# Patient Record
Sex: FEMALE | Race: BLACK OR AFRICAN AMERICAN | NOT HISPANIC OR LATINO | URBAN - METROPOLITAN AREA
[De-identification: names, ages, dates, MRNs, and addresses within clinical notes are randomized per-mention and may not be internally consistent; named-entity substitution may affect disease eponyms.]

---

## 2020-01-07 ENCOUNTER — OFFICE (OUTPATIENT)
Dept: URBAN - METROPOLITAN AREA CLINIC 19 | Facility: CLINIC | Age: 45
End: 2020-01-07
Payer: COMMERCIAL

## 2020-01-07 VITALS
SYSTOLIC BLOOD PRESSURE: 120 MMHG | HEART RATE: 94 BPM | HEIGHT: 68 IN | DIASTOLIC BLOOD PRESSURE: 73 MMHG | WEIGHT: 139 LBS

## 2020-01-07 DIAGNOSIS — K92.1 MELENA: ICD-10-CM

## 2020-01-07 DIAGNOSIS — R19.5 OTHER FECAL ABNORMALITIES: ICD-10-CM

## 2020-01-07 DIAGNOSIS — R19.4 CHANGE IN BOWEL HABIT: ICD-10-CM

## 2020-01-07 PROCEDURE — 99203 OFFICE O/P NEW LOW 30 MIN: CPT | Performed by: INTERNAL MEDICINE

## 2020-01-07 RX ORDER — SODIUM PICOSULFATE, MAGNESIUM OXIDE, AND ANHYDROUS CITRIC ACID 10; 3.5; 12 MG/160ML; G/160ML; G/160ML
LIQUID ORAL
Qty: 1 | Refills: 0 | Status: ACTIVE
Start: 2020-01-07

## 2020-01-07 NOTE — SERVICEHPINOTES
44-year-old  female reports that she noticed a change in her bowel habits in October 2019. Previously she would have a formed bowel movement every other day.  Since October 2019 she started experiencing loose stools with bright red blood mixed with the stool.  Also reports cramping discomfort in the abdomen.  Reports that stool studies were negative at primary care provider's office.  I currently do not have access to these results.  Reports that she was empirically treated with ciprofloxacin.  Continues to report that bowel movements are back and forth.  She has cut down on intake of sodas from 3 to 1/day.  Does not drink carbonated water.  Reports undergoing an EGD in the past and was diagnosed with gastritis and reports that H pylori testing was negative.  Uses Aleve rarely.  No history of peptic ulcer disease. No family history of colon cancer or colon polyps or IBD.  No cardiac issues and not on any blood thinners or diet pills.

## 2020-01-09 ENCOUNTER — OFFICE (OUTPATIENT)
Dept: URBAN - METROPOLITAN AREA CLINIC 19 | Facility: CLINIC | Age: 45
End: 2020-01-09

## 2020-01-09 LAB — C DIFFICILE TOXIN GENE NAA: NEGATIVE
